# Patient Record
Sex: FEMALE | Race: WHITE | NOT HISPANIC OR LATINO | Employment: FULL TIME | URBAN - NONMETROPOLITAN AREA
[De-identification: names, ages, dates, MRNs, and addresses within clinical notes are randomized per-mention and may not be internally consistent; named-entity substitution may affect disease eponyms.]

---

## 2023-08-17 ENCOUNTER — OFFICE VISIT (OUTPATIENT)
Dept: PSYCHIATRY | Facility: CLINIC | Age: 43
End: 2023-08-17
Payer: COMMERCIAL

## 2023-08-17 VITALS
HEIGHT: 64 IN | WEIGHT: 165 LBS | DIASTOLIC BLOOD PRESSURE: 76 MMHG | SYSTOLIC BLOOD PRESSURE: 110 MMHG | BODY MASS INDEX: 28.17 KG/M2 | HEART RATE: 85 BPM

## 2023-08-17 DIAGNOSIS — F90.2 ADHD (ATTENTION DEFICIT HYPERACTIVITY DISORDER), COMBINED TYPE: Chronic | ICD-10-CM

## 2023-08-17 DIAGNOSIS — F41.1 GENERALIZED ANXIETY DISORDER: Primary | Chronic | ICD-10-CM

## 2023-08-17 DIAGNOSIS — F33.1 MODERATE EPISODE OF RECURRENT MAJOR DEPRESSIVE DISORDER: Chronic | ICD-10-CM

## 2023-08-17 PROCEDURE — 99214 OFFICE O/P EST MOD 30 MIN: CPT | Performed by: NURSE PRACTITIONER

## 2023-08-17 RX ORDER — PROPRANOLOL HYDROCHLORIDE 10 MG/1
10 TABLET ORAL 3 TIMES DAILY PRN
Qty: 90 TABLET | Refills: 2 | Status: SHIPPED | OUTPATIENT
Start: 2023-08-17

## 2023-08-17 NOTE — PROGRESS NOTES
"Chief Complaint  Anxiety, Depression, and ADHD    Subjective          Marisela Liu presents to [facility] for medication management of her anxiety, depression, ADHD.    History of Present Illness: Patient presents today for follow-up appointment at last been seen on 07/05/2023 for an initial evaluation.  At that appointment she was started on Wellbutrin.  Patient says today \"I am doing pretty good, struggling still some with anxiety, especially at night\".  Patient says she has been very consistent about taking her Wellbutrin.  She says she thinks she has only missed a dose of it somewhere between 2 and 3 times.  She is also still taking her hydroxyzine at night to help with her sleep, and says it does seem to help most of the time, but sometimes it does not.  Patient says she still finds her self waking up at night with anxiety, \"it is just random things like something going to happen to one of my kids at night, or someone going to break into the house\".  Patient says her daytime anxiety is also still an issue at times.  She does feel her mood has been improved, and her focus has also been improved.  She says they took a family vacation the patient forward recently and had a good time.  She says there have been no significant adverse effects associated with the Wellbutrin.  She has had some nausea at times, and headaches, but feels they are also connected.  She says her appetite has been decreased a little, but not significantly.  Patient denies any SI/HI, A/V hallucinations.      The following portions of the patient's history were reviewed and updated as appropriate: allergies, current medications, past family history, past medical history, past social history, past surgical history and problem list.      Current Medications:   Current Outpatient Medications   Medication Sig Dispense Refill    buPROPion SR (Wellbutrin SR) 150 MG 12 hr tablet Take 1 tablet every morning for 7 " "days, then 1 tablet twice daily after 60 tablet 2    Cholecalciferol 50 MCG (2000 UT) capsule Take by oral route.      folic acid (FOLVITE) 1 MG tablet Take 1 tablet 3 times a day by oral route.      methotrexate 2.5 MG tablet Take 1 tablet by mouth 1 (One) Time Per Week.      Vistaril 25 MG capsule Take 1 capsule by mouth.      propranolol (INDERAL) 10 MG tablet Take 1 tablet by mouth 3 (Three) Times a Day As Needed (Anxiety). 90 tablet 2     No current facility-administered medications for this visit.       Mental Status Exam:   Hygiene:   good  Cooperation:  Cooperative  Eye Contact:  Good  Psychomotor Behavior:  Restless  Affect:  Appropriate  Mood: anxious  Speech:  Normal  Thought Process:  Goal directed  Thought Content:  Mood congruent  Suicidal:  None  Homicidal:  None  Hallucinations:  None  Delusion:  None  Memory:  Intact  Orientation:  Person, Place, Time, and Situation  Reliability:  good  Insight:  Good  Judgement:  Good  Impulse Control:  Good  Physical/Medical Issues:   RA, migraines        Objective   Vital Signs:   /76   Pulse 85   Ht 162.6 cm (64.02\")   Wt 74.8 kg (165 lb)   BMI 28.31 kg/mý     Physical Exam  Neurological:      Mental Status: She is oriented to person, place, and time. Mental status is at baseline.      Coordination: Coordination is intact.      Gait: Gait is intact.   Psychiatric:         Behavior: Behavior is cooperative.      Result Review :     The following data was reviewed by: KASSIDY Thomson on 08/17/2023:    Data reviewed : Previous note, medication history           Assessment and Plan    Diagnoses and all orders for this visit:    1. Generalized anxiety disorder (Primary)  -     propranolol (INDERAL) 10 MG tablet; Take 1 tablet by mouth 3 (Three) Times a Day As Needed (Anxiety).  Dispense: 90 tablet; Refill: 2    2. ADHD (attention deficit hyperactivity disorder), combined type    3. Moderate episode of recurrent major depressive disorder         PHQ-9 " Score:   PHQ-9 Total Score: 7      Depression Screening:  Patient screened positive for depression based on a PHQ-9 score of 7 on 8/17/2023. Follow-up recommendations include: Prescribed antidepressant medication treatment and Suicide Risk Assessment performed.        Tobacco Cessation:  Patient is a former tobacco user. No tobacco cessation education necessary.      Impression/Plan:  -This is my first follow-up appointment with patient.  Patient presents today and reports she has been doing better since her last appointment.  She says she is taking her medication as prescribed and consistently.  She has had some nausea and headaches, but is not sure it is related directly to her medication.  Advised patient headache would be more calm and then nausea, and they could be related to something else.  She has been struggling still with her anxiety, and feels it is still too high on a consistent basis.  Discussed starting an as needed medication for anxiety, and the patient says she would be open to doing that.  We will start propranolol 10 mg.  -Start propranolol 10 mg 3 times daily as needed.  We discussed risks versus benefits, as well as potential adverse effects associated with adding this medication to patient's daily regimen. Patient is in agreement with this plan and was educated on the importance of compliance with all aspects of treatment and follow-up appointments. Patient is agreeable to call the office with any worsening of symptoms or onset of side effects.  -Maintain Wellbutrin  mg twice daily.  -Maintain hydroxyzine 25 mg nightly as needed.  -Patient's SOFÍA report reviewed and deemed appropriate.  Patient counseled on use of controlled substances.  -Reviewed available lab work.  -Schedule follow-up appointment for 8 weeks or as needed.      MEDS ORDERED DURING VISIT:  New Medications Ordered This Visit   Medications    propranolol (INDERAL) 10 MG tablet     Sig: Take 1 tablet by mouth 3 (Three)  Times a Day As Needed (Anxiety).     Dispense:  90 tablet     Refill:  2         Follow Up   Return in about 8 weeks (around 10/12/2023), or if symptoms worsen or fail to improve, for Next scheduled follow up, In-Person Appt.  Patient was given instructions and counseling regarding her condition or for health maintenance advice. Please see specific information pulled into the AVS if appropriate.       TREATMENT PLAN/GOALS: Continue supportive psychotherapy efforts and medications as indicated. Treatment and medication options discussed during today's visit. Patient acknowledged and verbally consented to continue with current treatment plan and was educated on the importance of compliance with treatment and follow-up appointments.    MEDICATION ISSUES:  Discussed medication options and treatment plan of prescribed medication as well as the risks, benefits, and side effects including potential falls, possible impaired driving and metabolic adversities among others. Patient is agreeable to call the office with any worsening of symptoms or onset of side effects. Patient is agreeable to call 911 or go to the nearest ER should he/she begin having SI/HI.          This document has been electronically signed by KASSIDY Holcomb, PMHNP-BC  August 17, 2023 15:41 EDT      Part of this note may be an electronic transcription/translation of spoken language to printed text using the Dragon Dictation System.

## 2023-10-16 ENCOUNTER — TELEMEDICINE (OUTPATIENT)
Dept: PSYCHIATRY | Facility: CLINIC | Age: 43
End: 2023-10-16
Payer: COMMERCIAL

## 2023-10-16 DIAGNOSIS — G47.09 OTHER INSOMNIA: ICD-10-CM

## 2023-10-16 DIAGNOSIS — F41.1 GENERALIZED ANXIETY DISORDER: Primary | Chronic | ICD-10-CM

## 2023-10-16 DIAGNOSIS — F33.1 MODERATE EPISODE OF RECURRENT MAJOR DEPRESSIVE DISORDER: Chronic | ICD-10-CM

## 2023-10-16 DIAGNOSIS — F90.2 ADHD (ATTENTION DEFICIT HYPERACTIVITY DISORDER), COMBINED TYPE: Chronic | ICD-10-CM

## 2023-10-16 PROCEDURE — 99214 OFFICE O/P EST MOD 30 MIN: CPT | Performed by: NURSE PRACTITIONER

## 2023-10-16 RX ORDER — BUPROPION HYDROCHLORIDE 150 MG/1
150 TABLET, EXTENDED RELEASE ORAL 2 TIMES DAILY
Qty: 60 TABLET | Refills: 5 | Status: SHIPPED | OUTPATIENT
Start: 2023-10-16

## 2023-10-16 RX ORDER — TRAZODONE HYDROCHLORIDE 50 MG/1
50 TABLET ORAL NIGHTLY
Qty: 30 TABLET | Refills: 2 | Status: SHIPPED | OUTPATIENT
Start: 2023-10-16

## 2023-10-16 NOTE — PROGRESS NOTES
"This provider is located at [facility], [facility] ,[address], [address] in [place], [place],using a secure MyChart Video Visit through [employer]. Patient is being seen remotely via telehealth at their home address in [place], and stated they are in a secure environment for this session. The patient's condition being diagnosed/treated is appropriate for telemedicine. The provider identified herself as well as her credentials.   The patient, and/or patients guardian, consent to be seen remotely, and when consent is given they understand that the consent allows for patient identifiable information to be sent to a third party as needed.   They may refuse to be seen remotely at any time. The electronic data is encrypted and password protected, and the patient and/or guardian has been advised of the potential risks to privacy not withstanding such measures.    Chief Complaint  Anxiety, Depression, and ADHD      Subjective          Marisela Liu presents today via MyChart Video through [employer] for medication management of her anxiety, depression, ADHD.    History of Present Illness: Patient presents today for follow-up appointment after last been seen on 08/17/2023.  At that appointment the patient was started on propranolol for anxiety.  Patient says \"I am doing pretty well\".  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  Patient says work overall has been good, but stressful.  She says home life has been stressful as well.  She says there have been multiple illnesses in their house recently and says \"things could be better, but they could definitely be worse\".  Patient says she took the propranolol that was prescribed to her 2 times.  She says both times it caused her to become very lightheaded and dizzy, and almost passed out.  She says her  told her to not take it " again until she was seen again today.  She says she is still taking her Wellbutrin twice a day.  She is also only using hydroxyzine at night to help with sleep.  She says it is helping some, but not significantly.  She feels Wellbutrin is still working well for her.  She denies any new or significant issues with her appetite.  Patient denies any SI/HI, A/V hallucinations.      The following portions of the patient's history were reviewed and updated as appropriate: allergies, current medications, past family history, past medical history, past social history, past surgical history and problem list.      Current Medications:   Current Outpatient Medications   Medication Sig Dispense Refill    buPROPion SR (Wellbutrin SR) 150 MG 12 hr tablet Take 1 tablet by mouth 2 (Two) Times a Day. 60 tablet 5    Cholecalciferol 50 MCG (2000 UT) capsule Take by oral route.      folic acid (FOLVITE) 1 MG tablet Take 1 tablet 3 times a day by oral route.      methotrexate 2.5 MG tablet Take 1 tablet by mouth 1 (One) Time Per Week.      propranolol (INDERAL) 10 MG tablet Take 1 tablet by mouth 3 (Three) Times a Day As Needed (Anxiety). 90 tablet 2    traZODone (DESYREL) 50 MG tablet Take 1 tablet by mouth Every Night. 30 tablet 2     No current facility-administered medications for this visit.       Mental Status Exam:   Hygiene:    MyChart video  Cooperation:  Cooperative  Eye Contact:  Good  Psychomotor Behavior:  Restless  Affect:  Appropriate  Mood: euthymic  Speech:  Normal  Thought Process:  Goal directed  Thought Content:  Mood congruent  Suicidal:  None  Homicidal:  None  Hallucinations:  None  Delusion:  None  Memory:  Intact  Orientation:  Person, Place, Time, and Situation  Reliability:  good  Insight:  Good  Judgement:  Good  Impulse Control:  Good  Physical/Medical Issues:   RA, migraines      Objective   Vital Signs:   There were no vitals taken for this visit.    Physical Exam  Neurological:      Mental Status: She is  oriented to person, place, and time. Mental status is at baseline.   Psychiatric:         Behavior: Behavior is cooperative.        Result Review :     The following data was reviewed by: KASSIDY Thomson on 10/16/2023:    Data reviewed : Previous note, medication history           Assessment and Plan    Diagnoses and all orders for this visit:    1. Generalized anxiety disorder (Primary)    2. ADHD (attention deficit hyperactivity disorder), combined type  -     buPROPion SR (Wellbutrin SR) 150 MG 12 hr tablet; Take 1 tablet by mouth 2 (Two) Times a Day.  Dispense: 60 tablet; Refill: 5    3. Moderate episode of recurrent major depressive disorder  -     buPROPion SR (Wellbutrin SR) 150 MG 12 hr tablet; Take 1 tablet by mouth 2 (Two) Times a Day.  Dispense: 60 tablet; Refill: 5    4. Other insomnia  -     traZODone (DESYREL) 50 MG tablet; Take 1 tablet by mouth Every Night.  Dispense: 30 tablet; Refill: 2         PHQ-9 Score:   PHQ-9 Total Score: (P) 6      Depression Screening:  Patient screened positive for depression based on a PHQ-9 score of 6 on 10/10/2023. Follow-up recommendations include: Prescribed antidepressant medication treatment and Suicide Risk Assessment performed.        Tobacco Cessation:  Patient is a former tobacco user. No tobacco cessation education necessary.      Impression/Plan:  -This is a follow-up appointment.  Patient presents today and reports she has been struggling some still with her anxiety, and is also not sleeping very well.  She tried taking propranolol, but could not handle the 10 mg dose.  Discussed trying a lower dose.  The patient did not try taking half a tablet, but would be willing to.  Patient is also struggling more so with sleep and not getting significant help from hydroxyzine.  She would also be willing to try something different.  -Decrease propranolol to 5 mg.  Encouraged the patient to try taking this soon and let me know whether or not she tolerates  it.  -Discontinue hydroxyzine 25 mg nightly as needed.  -Start trazodone 50 mg nightly.  We discussed risks versus benefits, as well as potential adverse effects associated with adding this medication to patient's daily regimen. Patient is in agreement with this plan and was educated on the importance of compliance with all aspects of treatment and follow-up appointments. Patient is agreeable to call the office with any worsening of symptoms or onset of side effects.  -Maintain Wellbutrin  mg twice daily.  -Patient's SOFÍA report reviewed and deemed appropriate.  Patient counseled on use of controlled substances.  -Reviewed available lab work.  -Schedule MyChart video follow-up appointment for 5 weeks or as needed.    MEDS ORDERED DURING VISIT:  New Medications Ordered This Visit   Medications    traZODone (DESYREL) 50 MG tablet     Sig: Take 1 tablet by mouth Every Night.     Dispense:  30 tablet     Refill:  2    buPROPion SR (Wellbutrin SR) 150 MG 12 hr tablet     Sig: Take 1 tablet by mouth 2 (Two) Times a Day.     Dispense:  60 tablet     Refill:  5         Follow Up   Return in about 5 weeks (around 11/20/2023), or if symptoms worsen or fail to improve, for Next scheduled follow up, Video visit.  Patient was given instructions and counseling regarding her condition or for health maintenance advice. Please see specific information pulled into the AVS if appropriate.       TREATMENT PLAN/GOALS: Continue supportive psychotherapy efforts and medications as indicated. Treatment and medication options discussed during today's visit. Patient acknowledged and verbally consented to continue with current treatment plan and was educated on the importance of compliance with treatment and follow-up appointments.    MEDICATION ISSUES:  Discussed medication options and treatment plan of prescribed medication as well as the risks, benefits, and side effects including potential falls, possible impaired driving and  metabolic adversities among others. Patient is agreeable to call the office with any worsening of symptoms or onset of side effects. Patient is agreeable to call 911 or go to the nearest ER should he/she begin having SI/HI.          This document has been electronically signed by KASSIDY Holcomb, PMHNP-BC  October 16, 2023 16:24 EDT      Part of this note may be an electronic transcription/translation of spoken language to printed text using the Dragon Dictation System.

## 2023-11-21 ENCOUNTER — TELEMEDICINE (OUTPATIENT)
Dept: PSYCHIATRY | Facility: CLINIC | Age: 43
End: 2023-11-21
Payer: COMMERCIAL

## 2023-11-21 DIAGNOSIS — F33.0 MILD EPISODE OF RECURRENT MAJOR DEPRESSIVE DISORDER: Chronic | ICD-10-CM

## 2023-11-21 DIAGNOSIS — F90.2 ADHD (ATTENTION DEFICIT HYPERACTIVITY DISORDER), COMBINED TYPE: Primary | Chronic | ICD-10-CM

## 2023-11-21 DIAGNOSIS — F41.1 GENERALIZED ANXIETY DISORDER: Chronic | ICD-10-CM

## 2023-11-21 DIAGNOSIS — G47.09 OTHER INSOMNIA: Chronic | ICD-10-CM

## 2023-11-21 PROCEDURE — 99214 OFFICE O/P EST MOD 30 MIN: CPT | Performed by: NURSE PRACTITIONER

## 2023-11-21 RX ORDER — TRAZODONE HYDROCHLORIDE 50 MG/1
50-100 TABLET ORAL NIGHTLY
Qty: 60 TABLET | Refills: 5 | Status: SHIPPED | OUTPATIENT
Start: 2023-11-21

## 2023-11-21 RX ORDER — PROPRANOLOL HYDROCHLORIDE 10 MG/1
5 TABLET ORAL 3 TIMES DAILY PRN
Qty: 45 TABLET | Refills: 5 | Status: SHIPPED | OUTPATIENT
Start: 2023-11-21

## 2024-01-19 ENCOUNTER — TELEMEDICINE (OUTPATIENT)
Dept: PSYCHIATRY | Facility: CLINIC | Age: 44
End: 2024-01-19
Payer: COMMERCIAL

## 2024-01-19 DIAGNOSIS — F33.0 MILD EPISODE OF RECURRENT MAJOR DEPRESSIVE DISORDER: Chronic | ICD-10-CM

## 2024-01-19 DIAGNOSIS — F90.2 ADHD (ATTENTION DEFICIT HYPERACTIVITY DISORDER), COMBINED TYPE: Primary | Chronic | ICD-10-CM

## 2024-01-19 DIAGNOSIS — G47.09 OTHER INSOMNIA: Chronic | ICD-10-CM

## 2024-01-19 DIAGNOSIS — F41.1 GENERALIZED ANXIETY DISORDER: Chronic | ICD-10-CM

## 2024-01-19 PROCEDURE — 99214 OFFICE O/P EST MOD 30 MIN: CPT | Performed by: NURSE PRACTITIONER

## 2024-01-19 NOTE — PROGRESS NOTES
"This provider is located at [facility], [facility] ,[address], [address] in [place], [place],using a secure [employer] Video Visit through [employer]. Patient is being seen remotely via telehealth at their home address in [place], and stated they are in a secure environment for this session. The patient's condition being diagnosed/treated is appropriate for telemedicine. The provider identified herself as well as her credentials.   The patient, and/or patients guardian, consent to be seen remotely, and when consent is given they understand that the consent allows for patient identifiable information to be sent to a third party as needed.   They may refuse to be seen remotely at any time. The electronic data is encrypted and password protected, and the patient and/or guardian has been advised of the potential risks to privacy not withstanding such measures.    Chief Complaint  Anxiety, Depression, ADHD, and Sleeping Problem      Subjective          Marisela Liu presents today via MyChart Video through [employer] for medication management of her anxiety, depression, ADHD, and sleeping difficulties.     History of Present Illness: Patient presents today for follow-up appointment after last been seen on 11/21/2023.  Patient says today \"I am doing well, just noted and right now\".  Patient says she had a good holiday season.  They went to [place] to visit for [place], and rented a cabin to stay in.  Patient says she is still struggling at times with \"irrational anxiety\" but otherwise has no issues or concerns.  She says the rest of her life seems to be going okay.  She initially says she is not sure why she still struggles with anxiety at random times, but does say she thinks it may be related to some questions about her physical health.  She says her most recent mammogram showed some dense tissue that was concerning.  She has an upcoming ultrasound to investigate that further.  Patient says " when she is struggling with her anxiety she takes 5 mg of propranolol which does still seem to help.  She says she is also still taking her Wellbutrin twice a day and her trazodone at night.  She feels both are sufficient where they are.  She does occasionally take an extra half a tablet to 1 tablet of trazodone if she is not able to get to sleep well enough.  She says she has no concerns regarding her depression symptoms and feels she is doing well there.  She is eating well and denies any concerns with her appetite.  Patient denies any SI/HI, A/V hallucinations.      The following portions of the patient's history were reviewed and updated as appropriate: allergies, current medications, past family history, past medical history, past social history, past surgical history and problem list.      Current Medications:   Current Outpatient Medications   Medication Sig Dispense Refill    buPROPion SR (Wellbutrin SR) 150 MG 12 hr tablet Take 1 tablet by mouth 2 (Two) Times a Day. 60 tablet 5    Cholecalciferol 50 MCG (2000 UT) capsule Take by oral route.      folic acid (FOLVITE) 1 MG tablet Take 1 tablet 3 times a day by oral route.      methotrexate 2.5 MG tablet Take 1 tablet by mouth 1 (One) Time Per Week.      propranolol (INDERAL) 10 MG tablet Take 0.5 tablets by mouth 3 (Three) Times a Day As Needed (Anxiety). 45 tablet 5    traZODone (DESYREL) 50 MG tablet Take 1-2 tablets by mouth Every Night. 60 tablet 5     No current facility-administered medications for this visit.       Mental Status Exam:   Hygiene:    MyChart video  Cooperation:  Cooperative  Eye Contact:  Good  Psychomotor Behavior:  Appropriate  Affect:  Appropriate  Mood: euthymic  Speech:  Normal  Thought Process:  Goal directed  Thought Content:  Mood congruent  Suicidal:  None  Homicidal:  None  Hallucinations:  None  Delusion:  None  Memory:  Intact  Orientation:  Person, Place, Time, and Situation  Reliability:  good  Insight:  Good  Judgement:   Good  Impulse Control:  Good  Physical/Medical Issues:   RA, migraines      Objective   Vital Signs:   There were no vitals taken for this visit.    Physical Exam  Neurological:      Mental Status: She is oriented to person, place, and time. Mental status is at baseline.   Psychiatric:         Behavior: Behavior is cooperative.        Result Review :     The following data was reviewed by: KASSIDY Thomson on 01/19/2024:    Data reviewed : Previous note, medication history           Assessment and Plan    Diagnoses and all orders for this visit:    1. ADHD (attention deficit hyperactivity disorder), combined type (Primary)    2. Mild episode of recurrent major depressive disorder    3. Generalized anxiety disorder    4. Other insomnia         PHQ-9 Score:   PHQ-9 Total Score: (P) 5      Depression Screening:  Patient screened positive for depression based on a PHQ-9 score of 5 on 1/19/2024. Follow-up recommendations include: Prescribed antidepressant medication treatment and Suicide Risk Assessment performed.        Tobacco Cessation:  Patient is a former tobacco user. No tobacco cessation education necessary.      Impression/Plan:  -This is a follow-up appointment.  Patient presents today and reports she has been doing okay overall since her last appointment.  She does report struggling at times still with random episodes of anxiety, but is able to take propranolol and feels it helps manage those times.  She says she is taking her other medications as prescribed as well and denies any adverse effects or concerns regarding them.  She feels her current medication regimen is sufficient for her overall symptom burden.  -Maintain Wellbutrin  mg twice daily for anxiety, depression, and ADHD symptoms.  -Maintain propranolol 5 mg 3 times daily as needed for anxiety.  -Maintain trazodone 50 mg nightly for sleeping difficulties.  -Patient's SOFÍA report reviewed and deemed appropriate.  Patient counseled on use of  controlled substances.  -Reviewed available lab work.  -Schedule MyChart video follow-up appointment for 3 months or as needed.    MEDS ORDERED DURING VISIT:  No orders of the defined types were placed in this encounter.        Follow Up   Return in about 3 months (around 4/19/2024), or if symptoms worsen or fail to improve, for Next scheduled follow up, Video visit.  Patient was given instructions and counseling regarding her condition or for health maintenance advice. Please see specific information pulled into the AVS if appropriate.       TREATMENT PLAN/GOALS: Continue supportive psychotherapy efforts and medications as indicated. Treatment and medication options discussed during today's visit. Patient acknowledged and verbally consented to continue with current treatment plan and was educated on the importance of compliance with treatment and follow-up appointments.    MEDICATION ISSUES:  Discussed medication options and treatment plan of prescribed medication as well as the risks, benefits, and side effects including potential falls, possible impaired driving and metabolic adversities among others. Patient is agreeable to call the office with any worsening of symptoms or onset of side effects. Patient is agreeable to call 911 or go to the nearest ER should he/she begin having SI/HI.          This document has been electronically signed by KASSIDY Holcomb, PMHNP-BC  January 19, 2024 11:12 EST      Part of this note may be an electronic transcription/translation of spoken language to printed text using the Dragon Dictation System.

## 2024-04-19 ENCOUNTER — TELEMEDICINE (OUTPATIENT)
Dept: PSYCHIATRY | Facility: CLINIC | Age: 44
End: 2024-04-19
Payer: COMMERCIAL

## 2024-04-19 DIAGNOSIS — F33.0 MILD EPISODE OF RECURRENT MAJOR DEPRESSIVE DISORDER: Chronic | ICD-10-CM

## 2024-04-19 DIAGNOSIS — F90.2 ADHD (ATTENTION DEFICIT HYPERACTIVITY DISORDER), COMBINED TYPE: Primary | Chronic | ICD-10-CM

## 2024-04-19 DIAGNOSIS — F41.1 GENERALIZED ANXIETY DISORDER: Chronic | ICD-10-CM

## 2024-04-19 DIAGNOSIS — G47.09 OTHER INSOMNIA: Chronic | ICD-10-CM

## 2024-04-19 PROCEDURE — 99214 OFFICE O/P EST MOD 30 MIN: CPT | Performed by: NURSE PRACTITIONER

## 2024-04-19 RX ORDER — BUPROPION HYDROCHLORIDE 150 MG/1
150 TABLET, EXTENDED RELEASE ORAL 2 TIMES DAILY
Qty: 60 TABLET | Refills: 5 | Status: SHIPPED | OUTPATIENT
Start: 2024-04-19

## 2024-04-19 RX ORDER — MECLIZINE HYDROCHLORIDE 25 MG/1
25 TABLET ORAL 3 TIMES DAILY PRN
COMMUNITY
Start: 2024-04-08 | End: 2025-04-08

## 2024-04-19 NOTE — PROGRESS NOTES
"This provider is located at The [facility], Behavioral Health ,[address], [address] in [place], [place],using a secure MyChart Video Visit through [employer]. Patient is being seen remotely via telehealth at their home address in [place], and stated they are in a secure environment for this session. The patient's condition being diagnosed/treated is appropriate for telemedicine. The provider identified herself as well as her credentials.   The patient, and/or patients guardian, consent to be seen remotely, and when consent is given they understand that the consent allows for patient identifiable information to be sent to a third party as needed.   They may refuse to be seen remotely at any time. The electronic data is encrypted and password protected, and the patient and/or guardian has been advised of the potential risks to privacy not withstanding such measures.    Chief Complaint  Anxiety, Depression, ADHD, and Sleeping Problem      Subjective              Marisela Liu presents today via MyChart Video through [employer] for medication management of her anxiety, depression, ADHD, and sleeping difficulties.     History of Present Illness: Patient presents today for follow-up appointment after last been seen on 01/19/2024.  Patient says today \"I am okay, but I have been having some health issues lately\".  Patient has been seeing a cardiologist and has an upcoming stress test and will also need to wear a Holter monitor.  She says they are also planning another type of test that she does not remember.  Patient says she has been having dizziness and tachycardia that seem to be coming out of nowhere.  She reports she was previously evaluated for POTS several years ago but was not officially diagnosed.  She is fairly certain that may be what is causing her difficulties now.  Patient says she has been taking all of her medications except for propranolol.  Because of everything that has been going on " [facility] she has been holding that.  She says Wellbutrin and trazodone continue to work well for her.  She has not had a significant increase in depression or anxiety symptoms despite the recent health issues.  She says trazodone continues to work very well for her sleep and she consistently takes 50 mg at night.  She does occasionally take somewhere between 75 and 100 mg if needed.  She says work has been stressful lately and she sometimes feels overwhelmed there but feels that is going to improve soon.  Patient says she has been sleeping and eating well for the most part.  She has continued to eat healthier and has lost around 20 pounds over the last 10 months.  Patient denies any SI/HI, A/V hallucinations.      The following portions of the patient's history were reviewed and updated as appropriate: allergies, current medications, past family history, past medical history, past social history, past surgical history and problem list.      Current Medications:   Current Outpatient Medications   Medication Sig Dispense Refill    buPROPion SR (Wellbutrin SR) 150 MG 12 hr tablet Take 1 tablet by mouth 2 (Two) Times a Day. 60 tablet 5    Cholecalciferol 50 MCG (2000 UT) capsule Take by oral route.      folic acid (FOLVITE) 1 MG tablet Take 1 tablet 3 times a day by oral route.      meclizine (ANTIVERT) 25 MG tablet Take 1 tablet by mouth 3 (Three) Times a Day As Needed.      methotrexate 2.5 MG tablet Take 1 tablet by mouth 1 (One) Time Per Week.      traZODone (DESYREL) 50 MG tablet Take 1-2 tablets by mouth Every Night. 60 tablet 5    propranolol (INDERAL) 10 MG tablet Take 0.5 tablets by mouth 3 (Three) Times a Day As Needed (Anxiety). (Patient not taking: Reported on 4/19/2024) 45 tablet 5     No current facility-administered medications for this visit.       Mental Status Exam:   Hygiene:    MyChart video  Cooperation:  Cooperative  Eye Contact:  Good  Psychomotor Behavior:  Appropriate  Affect:   Appropriate  Mood: euthymic  Speech:  Normal  Thought Process:  Goal directed  Thought Content:  Mood congruent  Suicidal:  None  Homicidal:  None  Hallucinations:  None  Delusion:  None  Memory:  Intact  Orientation:  Person, Place, Time, and Situation  Reliability:  good  Insight:  Good  Judgement:  Good  Impulse Control:  Good  Physical/Medical Issues:   RA, migraines      Objective   Vital Signs:   There were no vitals taken for this visit.    Physical Exam  Neurological:      Mental Status: She is oriented to person, place, and time. Mental status is at baseline.   Psychiatric:         Behavior: Behavior is cooperative.        Result Review :     The following data was reviewed by: KASSIDY Thomson on 04/19/2024:    Data reviewed : Previous note, medication history           Assessment and Plan    Diagnoses and all orders for this visit:    1. ADHD (attention deficit hyperactivity disorder), combined type (Primary)  -     buPROPion SR (Wellbutrin SR) 150 MG 12 hr tablet; Take 1 tablet by mouth 2 (Two) Times a Day.  Dispense: 60 tablet; Refill: 5    2. Mild episode of recurrent major depressive disorder  -     buPROPion SR (Wellbutrin SR) 150 MG 12 hr tablet; Take 1 tablet by mouth 2 (Two) Times a Day.  Dispense: 60 tablet; Refill: 5    3. Generalized anxiety disorder    4. Other insomnia           PHQ-9 Score:   PHQ-9 Total Score: (P) 5      Depression Screening:  Patient screened positive for depression based on a PHQ-9 score of 5 on 4/17/2024. Follow-up recommendations include: Prescribed antidepressant medication treatment and Suicide Risk Assessment performed.        Tobacco Cessation:  Patient is a former tobacco user. No tobacco cessation education necessary.      Impression/Plan:  -This is a follow-up appointment.  Patient presents today and says aside from some anxiety surrounding health issues as discussed in HPI she feels she is doing well overall.  She denies any significant issues or concerns  outside of that today.  She says she is taking her medication consistently with the exception of propranolol.  She is happy still with Wellbutrin and trazodone and feels that combination of medications is working well for her symptom burden.  We will maintain Wellbutrin and trazodone and then reevaluate at her next appointment once she has completed all of her testing.  I did advise her to continue holding propranolol for now.  -Hold propranolol 5 mg 3 times daily as needed for anxiety.  -Maintain Wellbutrin  mg twice daily for anxiety, depression, and ADHD symptoms.  -Maintain trazodone 50 mg nightly for sleeping difficulties.  -Patient's SOFÍA report reviewed and deemed appropriate.  Patient counseled on use of controlled substances.  -Reviewed available lab work.  -Schedule MyChart video follow-up appointment for 3 months or as needed.    MEDS ORDERED DURING VISIT:  New Medications Ordered This Visit   Medications    buPROPion SR (Wellbutrin SR) 150 MG 12 hr tablet     Sig: Take 1 tablet by mouth 2 (Two) Times a Day.     Dispense:  60 tablet     Refill:  5         Follow Up   Return in about 3 months (around 7/19/2024), or if symptoms worsen or fail to improve, for Next scheduled follow up, Video visit.  Patient was given instructions and counseling regarding her condition or for health maintenance advice. Please see specific information pulled into the AVS if appropriate.       TREATMENT PLAN/GOALS: Continue supportive psychotherapy efforts and medications as indicated. Treatment and medication options discussed during today's visit. Patient acknowledged and verbally consented to continue with current treatment plan and was educated on the importance of compliance with treatment and follow-up appointments.    MEDICATION ISSUES:  Discussed medication options and treatment plan of prescribed medication as well as the risks, benefits, and side effects including potential falls, possible impaired driving and  metabolic adversities among others. Patient is agreeable to call the office with any worsening of symptoms or onset of side effects. Patient is agreeable to call 911 or go to the nearest ER should he/she begin having SI/HI.          This document has been electronically signed by KASSIDY Holcomb, PMHNP-BC  April 19, 2024 08:15 EDT      Part of this note may be an electronic transcription/translation of spoken language to printed text using the Dragon Dictation System.

## 2024-08-02 ENCOUNTER — TELEMEDICINE (OUTPATIENT)
Dept: PSYCHIATRY | Facility: CLINIC | Age: 44
End: 2024-08-02
Payer: COMMERCIAL

## 2024-08-02 DIAGNOSIS — F41.1 GENERALIZED ANXIETY DISORDER: Primary | Chronic | ICD-10-CM

## 2024-08-02 DIAGNOSIS — F33.0 MILD EPISODE OF RECURRENT MAJOR DEPRESSIVE DISORDER: Chronic | ICD-10-CM

## 2024-08-02 DIAGNOSIS — F90.2 ADHD (ATTENTION DEFICIT HYPERACTIVITY DISORDER), COMBINED TYPE: Chronic | ICD-10-CM

## 2024-08-02 DIAGNOSIS — G47.09 OTHER INSOMNIA: Chronic | ICD-10-CM

## 2024-08-02 PROCEDURE — 99214 OFFICE O/P EST MOD 30 MIN: CPT | Performed by: NURSE PRACTITIONER

## 2024-08-02 NOTE — PROGRESS NOTES
"This provider is located at The [facility], Behavioral Health ,[address], [address] in [place], [place],using a secure [employer] Video Visit through [employer]. Patient is being seen remotely via telehealth at their home address in [place], and stated they are in a secure environment for this session. The patient's condition being diagnosed/treated is appropriate for telemedicine. The provider identified herself as well as her credentials.   The patient, and/or patients guardian, consent to be seen remotely, and when consent is given they understand that the consent allows for patient identifiable information to be sent to a third party as needed.   They may refuse to be seen remotely at any time. The electronic data is encrypted and password protected, and the patient and/or guardian has been advised of the potential risks to privacy not withstanding such measures.    Chief Complaint  Anxiety, Depression, ADHD, and Sleeping Problem      Subjective                  Marisela Liu presents today via MyChart Video through [employer] for medication management of her anxiety, depression, ADHD, and sleeping difficulties.     History of Present Illness: Patient presents today for follow-up appointment after last been seen on 04/19/2024.  Patient says \"I am currently on my last day of quarantine from COVID\".  She went to a conference last week in [place] and thought her allergies were acting up when she got back but it ended up being COVID.  Patient says she is finally feeling better today.  Patient says overall things seem to be going okay.  She says her medications help \"for the most part\".  She says she is still struggling with anxiety at times but says \"I think it is just life stuff\".  Patient says she is good about using her coping skills whenever her anxiety seems to .  At her last appointment her propranolol was held because there was suspicion she may be suffering from POTS.  After " multiple tests, she says that was ruled out and everything returned normal.  She is now seeing an ENT for testing and they are looking at vestibular migraines versus Ménière's disease.  Patient says home life and work have both been going okay.  She is not looking forward to school starting back up, but does say she is looking forward to returning to her office.  Patient says she has been sleeping well as long as she takes her trazodone and denies any concerns there.  She says her appetite has been adequate.  Patient denies any SI/HI, A/V hallucinations.      The following portions of the patient's history were reviewed and updated as appropriate: allergies, current medications, past family history, past medical history, past social history, past surgical history and problem list.      Current Medications:   Current Outpatient Medications   Medication Sig Dispense Refill    buPROPion SR (Wellbutrin SR) 150 MG 12 hr tablet Take 1 tablet by mouth 2 (Two) Times a Day. 60 tablet 5    Cholecalciferol 50 MCG (2000 UT) capsule Take by oral route.      folic acid (FOLVITE) 1 MG tablet Take 1 tablet 3 times a day by oral route.      meclizine (ANTIVERT) 25 MG tablet Take 1 tablet by mouth 3 (Three) Times a Day As Needed.      methotrexate 2.5 MG tablet Take 1 tablet by mouth 1 (One) Time Per Week.      traZODone (DESYREL) 50 MG tablet Take 1-2 tablets by mouth Every Night. 60 tablet 5    propranolol (INDERAL) 10 MG tablet Take 0.5 tablets by mouth 3 (Three) Times a Day As Needed (Anxiety). (Patient not taking: Reported on 8/2/2024) 45 tablet 5     No current facility-administered medications for this visit.       Mental Status Exam:   Hygiene:    MyChart video  Cooperation:  Cooperative  Eye Contact:  Good  Psychomotor Behavior:  Appropriate  Affect:  Appropriate  Mood: euthymic  Speech:  Normal  Thought Process:  Goal directed  Thought Content:  Mood congruent  Suicidal:  None  Homicidal:  None  Hallucinations:   None  Delusion:  None  Memory:  Intact  Orientation:  Person, Place, Time, and Situation  Reliability:  good  Insight:  Good  Judgement:  Good  Impulse Control:  Good  Physical/Medical Issues:   RA, migraines      Objective   Vital Signs:   There were no vitals taken for this visit.    Physical Exam  Neurological:      Mental Status: She is oriented to person, place, and time. Mental status is at baseline.   Psychiatric:         Behavior: Behavior is cooperative.        Result Review :     The following data was reviewed by: KASSIDY Thomson on 08/02/2024:    Data reviewed : Previous note, medication history           Assessment and Plan    Diagnoses and all orders for this visit:    1. Generalized anxiety disorder (Primary)    2. Mild episode of recurrent major depressive disorder    3. ADHD (attention deficit hyperactivity disorder), combined type    4. Other insomnia             PHQ-9 Score:   PHQ-9 Total Score: 3      Depression Screening:  Patient screened positive for depression based on a PHQ-9 score of 3 on 7/28/2024. Follow-up recommendations include: Prescribed antidepressant medication treatment and Suicide Risk Assessment performed.        Tobacco Cessation:  Patient is a former tobacco user. No tobacco cessation education necessary.      Impression/Plan:  -This is a follow-up appointment.  Patient presents today and says overall she feels she has been doing well since her last appointment.  She says she is still taking her medications as prescribed and denies any adverse effects associated with them.  She was advised to hold propranolol at her last appointment but we will resume that today after all of her cardiology tests returned normal.  Advised patient she can return to using it as needed for anxiety.  She says she feels her other medications have continued to work well for her anxiety, depression and sleep dysfunction.  She has no new issues or concerns she needs to address today.  -Resume  propranolol 5 mg 3 times daily as needed for anxiety.  -Maintain Wellbutrin  mg twice daily for anxiety, depression, and ADHD symptoms.  -Maintain trazodone 50 mg nightly for sleeping difficulties.  -Patient's SOFÍA report reviewed and deemed appropriate.  Patient counseled on use of controlled substances.  -Reviewed available lab work.  -Schedule MyChart video follow-up appointment for 4 months or as needed.    MEDS ORDERED DURING VISIT:  No orders of the defined types were placed in this encounter.        Follow Up   Return in about 4 months (around 12/2/2024), or if symptoms worsen or fail to improve, for Next scheduled follow up, Video visit.  Patient was given instructions and counseling regarding her condition or for health maintenance advice. Please see specific information pulled into the AVS if appropriate.       TREATMENT PLAN/GOALS: Continue supportive psychotherapy efforts and medications as indicated. Treatment and medication options discussed during today's visit. Patient acknowledged and verbally consented to continue with current treatment plan and was educated on the importance of compliance with treatment and follow-up appointments.    MEDICATION ISSUES:  Discussed medication options and treatment plan of prescribed medication as well as the risks, benefits, and side effects including potential falls, possible impaired driving and metabolic adversities among others. Patient is agreeable to call the office with any worsening of symptoms or onset of side effects. Patient is agreeable to call 911 or go to the nearest ER should he/she begin having SI/HI.          This document has been electronically signed by KASSIDY Holcomb, PMHNP-BC  August 2, 2024 10:33 EDT      Part of this note may be an electronic transcription/translation of spoken language to printed text using the Dragon Dictation System.

## 2024-11-04 DIAGNOSIS — G47.09 OTHER INSOMNIA: Chronic | ICD-10-CM

## 2024-11-05 RX ORDER — TRAZODONE HYDROCHLORIDE 50 MG/1
50-100 TABLET, FILM COATED ORAL NIGHTLY
Qty: 60 TABLET | Refills: 5 | Status: SHIPPED | OUTPATIENT
Start: 2024-11-05